# Patient Record
Sex: FEMALE | Race: BLACK OR AFRICAN AMERICAN | Employment: FULL TIME | URBAN - METROPOLITAN AREA
[De-identification: names, ages, dates, MRNs, and addresses within clinical notes are randomized per-mention and may not be internally consistent; named-entity substitution may affect disease eponyms.]

---

## 2021-06-16 ENCOUNTER — APPOINTMENT (OUTPATIENT)
Dept: PHYSICAL THERAPY | Age: 41
End: 2021-06-16

## 2021-06-29 ENCOUNTER — HOSPITAL ENCOUNTER (OUTPATIENT)
Dept: PHYSICAL THERAPY | Age: 41
Discharge: HOME | End: 2021-06-29
Payer: COMMERCIAL

## 2021-06-29 PROCEDURE — 97110 THERAPEUTIC EXERCISES: CPT | Performed by: PHYSICAL THERAPIST

## 2021-06-29 PROCEDURE — 97161 PT EVAL LOW COMPLEX 20 MIN: CPT | Performed by: PHYSICAL THERAPIST

## 2021-06-29 NOTE — PROGRESS NOTES
PT INITIAL EVALUATION NOTE - [facility] 2-15    Patient Name: Aimee Echevarria  Date:2021  : 1980  [x]  Patient  Verified  Payor: /    In time:750 AM  Out time:825 AM  Total Treatment Time (min): 35  Total Timed Codes (min): 15  1:1 Treatment Time ( [address] only): 15   Visit #: 1     Treatment Area: Right knee pain [M25.561]    SUBJECTIVE  Pain Level (0-10 scale): current 0 worst 4  Any medication changes, allergies to medications, adverse drug reactions, diagnosis change, or new procedure performed?: [] No    [x] Yes (see summary sheet for update)  Subjective:    Patient referred to PT with a chief complaint of R knee pain. She started a walking challenge in April and walked every day in May. At the end of May her R knee started popping and felt weak. She took a few days to elevate her knee which made it feel stronger. She saw Dr. Renate Tracy the beginning of  and her knee stopped popping. She had x-rays which were normal.   Pain Location: anterior R knee   Pain Description: discomfort   Aggravating Factors: walking   Relieving factors: walking with limp, elevation, rest   Current Functional Limitations: discomfort with prolonged walking, currently limiting walks to 3 days per week 2-3 miles    PLOF: Able to walk without popping and discomfort  Mechanism of Injury: none  Previous Treatment/Compliance: none  PMHx/Surgical Hx: HTN, Asthma   Work Hx: [employer]   Living Situation: lives with    Pt Goals: \"Strenghten my knee and also gain knowledge on what I can do at home. \"   Barriers: none   Motivation: good           OBJECTIVE/EXAMINATION  Observation: B knee hyperextension standing    Squat: B femoral adduction    Gait: Normal    Edema: none    ROM:     Hip PROM: B WFL    Knee ROM: WFL    Strength:     R Hip flexion 5/5  R Hip abduction 4/5  R Hip extension 4/5  R Knee extension 5/5  R Knee flexion 5/5    L Hip flexion 5/5  L Hip abduction 4/5  L Hip extension 4/5  L Knee extension 5/5  L Knee flexion 5/5    Palpation: non tender to palpation R fat pad     Joint mobility: hypermobile patellar glides B    Special Tests: 90 90 -, Michael's + B       15 min Therapeutic Exercise:  [x] See flow sheet : SLR flexion, bridge with squeeze, side lying hip abduction, SLS   Rationale: increase ROM, increase strength and improve balance to improve the patients ability to walk for exercise              With   [x] TE   [] TA   [] neuro   [] other: Patient Education: [x] Review HEP    [] Progressed/Changed HEP based on:   [] positioning   [] body mechanics   [] transfers   [] heat/ice application    [x] other: can increase walking to 4 days per week over the next 2 weeks, role of PT, expected course of PT, avoid deep knee flexion under a load       Other Objective/Functional Measures:NT    Pain Level (0-10 scale) post treatment: 0     ASSESSMENT/Changes in Function:     [x]  See Plan of [address], PT 6/29/2021  7:50 AM

## 2021-06-29 NOTE — PROGRESS NOTES
[facility] Physical Therapy  [address]  [place], [address]  Phone: [phone]  Fax: [phone]    Plan of Care/Statement of Necessity for Physical Therapy Services  2-15    Patient name: Adriane Ortiz  : 1980  Provider#: [id number]  Referral source: Yari Cardenas MD      Medical/Treatment Diagnosis: Right knee pain [M25.561]     Prior Hospitalization: see medical history     Comorbidities: HTN, Asthma  Prior Level of Function: Able to walk without popping and discomfort  Medications: Verified on Patient Summary List    Start of Care: 21      Onset Date: May 2021       The Plan of Care and following information is based on the information from the initial evaluation. Assessment/ key information: Patient presents with decreased R LE strength contributing to  R knee pain limiting ability to perform functional activities such as walking. She would benefit from skilled PT to increase R LE strength and decrease pain so she can return to prior level of function.      Evaluation Complexity History MEDIUM  Complexity : 1-2 comorbidities / personal factors will impact the outcome/ POC ; Examination LOW Complexity : 1-2 Standardized tests and measures addressing body structure, function, activity limitation and / or participation in recreation  ;Presentation LOW Complexity : Stable, uncomplicated  ;Clinical Decision Making LOW Complexity : FOTO score of   Overall Complexity Rating: LOW     Problem List: pain affecting function, decrease ROM, decrease strength, impaired gait/ balance, decrease ADL/ functional abilitiies and decrease activity tolerance   Treatment Plan may include any combination of the following: Therapeutic exercise, Therapeutic activities, Neuromuscular re-education, Physical agent/modality, Manual therapy and Patient education  Patient / Family readiness to learn indicated by: asking questions, trying to perform skills and interest  Persons(s) to be included in education: patient (P)  Barriers to Learning/Limitations: None  Patient Goal (s): Strenghten my knee and also gain knowledge on what I can do at home  Patient Self Reported Health Status: good  Rehabilitation Potential: good    Short Term Goals: To be accomplished in 2 weeks:    1. Patient will be I in HEP to promote self management of symptoms. 2. Patient will report pain level at worst as less than or equal to 2/10 so they can perform ADLs without pain. Long Term Goals: To be accomplished in 4-6 weeks:    1. Patient will report pain level at worst as less than or equal to 1/10 so they can perform ADLs without pain. 2. Patient will be able to ambulate 3 miles 5 days per week without R knee discomfort. Frequency / Duration: Patient to be seen 2 times per week for 4 weeks. Patient/ Caregiver education and instruction: exercises    [x]  Plan of care has been reviewed with VELIA Jones, PT 6/29/2021 8:30 AM    ________________________________________________________________________    I certify that the above Therapy Services are being furnished while the patient is under my care. I agree with the treatment plan and certify that this therapy is necessary.     [phone] Signature:____________________  Date:____________Time: _________

## 2021-07-01 ENCOUNTER — HOSPITAL ENCOUNTER (OUTPATIENT)
Dept: PHYSICAL THERAPY | Age: 41
Discharge: HOME | End: 2021-07-01
Payer: COMMERCIAL

## 2021-07-01 PROCEDURE — 97110 THERAPEUTIC EXERCISES: CPT | Performed by: PHYSICAL THERAPIST

## 2021-07-01 PROCEDURE — 97112 NEUROMUSCULAR REEDUCATION: CPT | Performed by: PHYSICAL THERAPIST

## 2021-07-01 NOTE — PROGRESS NOTES
PT DAILY TREATMENT NOTE - [facility] -15    Patient Name: Vincent Avalos  Date:2021  : 1980  [x]  Patient  Verified  Payor: Yamila Dinorah Ortize S / Plan: [facility] MEDICAL [place] [address] / Product Type: Commerical /    In time:1015 AM  Out time:1100 AM  Total Treatment Time (min): 45  Total Timed Codes (min): 45  Visit #:  2    Treatment Area: Right knee pain [M25.561]    SUBJECTIVE  Pain Level (0-10 scale): 0  Any medication changes, allergies to medications, adverse drug reactions, diagnosis change, or new procedure performed?: [x] No    [] Yes (see summary sheet for update)  Subjective functional status/changes:   [] No changes reported  Patient reports that she has had a funny feeling in her posterior R knee. She did her exercises yesterday. OBJECTIVE      35 min Therapeutic Exercise:  [x] See flow sheet : progressed per flow sheet    Rationale: increase ROM and increase strength to improve the patients ability to walk for exercise        10 min Neuromuscular Re-education:  [x]  See flow sheet : balance and proprioception exercises per flow sheet   Rationale: increase proprioception  to improve the patients ability to walk for exercise            With   [x] TE   [] TA   [] neuro   [] other: Patient Education: [x] Review HEP    [] Progressed/Changed HEP based on:   [] positioning   [] body mechanics   [] transfers   [] heat/ice application    [] other:      Other Objective/Functional Measures: NT     Pain Level (0-10 scale) post treatment: 0    ASSESSMENT/Changes in Function:   Patient tolerated exercise progression well today. Patient will continue to benefit from skilled PT services to modify and progress therapeutic interventions, address functional mobility deficits, address ROM deficits, address strength deficits, analyze and address soft tissue restrictions, analyze and cue movement patterns and analyze and modify body mechanics/ergonomics to attain remaining goals. Progress towards goals / Updated goals:  Short Term Goals: To be accomplished in 2 weeks:               1. Patient will be I in HEP to promote self management of symptoms. PROGRESSING              2. Patient will report pain level at worst as less than or equal to 2/10 so they can perform ADLs without pain. PROGRESSING  Long Term Goals: To be accomplished in 4-6 weeks:               1.  Patient will report pain level at worst as less than or equal to 1/10 so they can perform ADLs without pain. 2. Patient will be able to ambulate 3 miles 5 days per week without R knee discomfort.      PLAN  [x]  Upgrade activities as tolerated     [x]  Continue plan of care  []  Update interventions per flow sheet       []  Discharge due to:_  []  Other:_      Pranav Fabian, PT 7/1/2021

## 2021-07-07 ENCOUNTER — HOSPITAL ENCOUNTER (OUTPATIENT)
Dept: PHYSICAL THERAPY | Age: 41
Discharge: HOME | End: 2021-07-07
Payer: COMMERCIAL

## 2021-07-07 PROCEDURE — 97110 THERAPEUTIC EXERCISES: CPT | Performed by: PHYSICAL THERAPIST

## 2021-07-07 PROCEDURE — 97112 NEUROMUSCULAR REEDUCATION: CPT | Performed by: PHYSICAL THERAPIST

## 2021-07-07 NOTE — PROGRESS NOTES
PT DAILY TREATMENT NOTE - [facility] 2-15    Patient Name: Adriane Ortiz  Date:2021  : 1980  [x]  Patient  Verified  Payor: MEDICAL Manajrrez Chilo / Plan: [facility] MEDICAL [employer] [address] / Product Type: Commerical /    In time:1100 AM  Out time:1200 PM  Total Treatment Time (min): 60  Total Timed Codes (min): 60  Visit #:  3    Treatment Area: Right knee pain [M25.561]    SUBJECTIVE  Pain Level (0-10 scale): 0  Any medication changes, allergies to medications, adverse drug reactions, diagnosis change, or new procedure performed?: [x] No    [] Yes (see summary sheet for update)  Subjective functional status/changes:   [] No changes reported  Patient reports that she feels like she is getting stronger. OBJECTIVE      35 min Therapeutic Exercise:  [x] See flow sheet : progressed per flow sheet    Rationale: increase ROM and increase strength to improve the patients ability to walk for exercise        25 min Neuromuscular Re-education:  [x]  See flow sheet : balance and proprioception exercises per flow sheet   Rationale: increase proprioception  to improve the patients ability to walk for exercise            With   [x] TE   [] TA   [] neuro   [] other: Patient Education: [x] Review HEP    [] Progressed/Changed HEP based on:   [] positioning   [] body mechanics   [] transfers   [] heat/ice application    [] other:      Other Objective/Functional Measures: NT     Pain Level (0-10 scale) post treatment: 0    ASSESSMENT/Changes in Function:   Progressing well with exercise program.   Patient will continue to benefit from skilled PT services to modify and progress therapeutic interventions, address functional mobility deficits, address ROM deficits, address strength deficits, analyze and address soft tissue restrictions, analyze and cue movement patterns and analyze and modify body mechanics/ergonomics to attain remaining goals. Progress towards goals / Updated goals:  Short Term Goals:  To be accomplished in 2 weeks:               1. Patient will be I in HEP to promote self management of symptoms. PROGRESSING              2. Patient will report pain level at worst as less than or equal to 2/10 so they can perform ADLs without pain. PROGRESSING  Long Term Goals: To be accomplished in 4-6 weeks:               1.  Patient will report pain level at worst as less than or equal to 1/10 so they can perform ADLs without pain. 2. Patient will be able to ambulate 3 miles 5 days per week without R knee discomfort.      PLAN  [x]  Upgrade activities as tolerated     [x]  Continue plan of care  []  Update interventions per flow sheet       []  Discharge due to:_  []  Other:_      Cholo Bobby, PT 7/7/2021

## 2021-07-20 ENCOUNTER — HOSPITAL ENCOUNTER (OUTPATIENT)
Dept: PHYSICAL THERAPY | Age: 41
Discharge: HOME | End: 2021-07-20
Payer: COMMERCIAL

## 2021-07-20 PROCEDURE — 97112 NEUROMUSCULAR REEDUCATION: CPT | Performed by: PHYSICAL THERAPIST

## 2021-07-20 PROCEDURE — 97110 THERAPEUTIC EXERCISES: CPT | Performed by: PHYSICAL THERAPIST

## 2021-07-20 NOTE — PROGRESS NOTES
PT DAILY TREATMENT NOTE - [facility] 2-15    Patient Name: Abdiel Cloud  Date:2021  : 1980  [x]  Patient  Verified  Payor: MEDICAL Manjarrez Chilo / Plan: [facility]ICAL [employer] [address] / Product Type: Commerical /    In time: 125 PM  Out time: 220 PM  Total Treatment Time (min): 55  Total Timed Codes (min): 55  Visit #:  4    Treatment Area: Right knee pain [M25.561]    SUBJECTIVE  Pain Level (0-10 scale): 0  Any medication changes, allergies to medications, adverse drug reactions, diagnosis change, or new procedure performed?: [x] No    [] Yes (see summary sheet for update)  Subjective functional status/changes:   [] No changes reported  Patient reports that her R knee feels good. OBJECTIVE      35 min Therapeutic Exercise:  [x] See flow sheet : progressed per flow sheet    Rationale: increase ROM and increase strength to improve the patients ability to walk for exercise        20 min Neuromuscular Re-education:  [x]  See flow sheet : balance and proprioception exercises per flow sheet   Rationale: increase proprioception  to improve the patients ability to walk for exercise            With   [x] TE   [] TA   [] neuro   [] other: Patient Education: [x] Review HEP    [] Progressed/Changed HEP based on:   [] positioning   [] body mechanics   [] transfers   [] heat/ice application    [] other:      Other Objective/Functional Measures: NT     Pain Level (0-10 scale) post treatment: 0    ASSESSMENT/Changes in Function:   Progressing well with exercise program. Progressing with strength and decreased pain. Patient will continue to benefit from skilled PT services to modify and progress therapeutic interventions, address functional mobility deficits, address ROM deficits, address strength deficits, analyze and address soft tissue restrictions, analyze and cue movement patterns and analyze and modify body mechanics/ergonomics to attain remaining goals.            Progress towards goals / Updated goals:  Short Term Goals: To be accomplished in 2 weeks:               1. Patient will be I in HEP to promote self management of symptoms. PROGRESSING              2. Patient will report pain level at worst as less than or equal to 2/10 so they can perform ADLs without pain. PROGRESSING  Long Term Goals: To be accomplished in 4-6 weeks:               1.  Patient will report pain level at worst as less than or equal to 1/10 so they can perform ADLs without pain. 2. Patient will be able to ambulate 3 miles 5 days per week without R knee discomfort.      PLAN  [x]  Upgrade activities as tolerated     [x]  Continue plan of care  []  Update interventions per flow sheet       []  Discharge due to:_  []  Other:_      Rony Jimenez, PT 7/20/2021

## 2021-07-23 ENCOUNTER — HOSPITAL ENCOUNTER (OUTPATIENT)
Dept: PHYSICAL THERAPY | Age: 41
Discharge: HOME | End: 2021-07-23
Payer: COMMERCIAL

## 2021-07-23 PROCEDURE — 97112 NEUROMUSCULAR REEDUCATION: CPT | Performed by: PHYSICAL THERAPIST

## 2021-07-23 PROCEDURE — 97110 THERAPEUTIC EXERCISES: CPT | Performed by: PHYSICAL THERAPIST

## 2021-07-23 NOTE — PROGRESS NOTES
PT DAILY TREATMENT NOTE - [facility] 2-15    Patient Name: Vincent Avalos  Date:2021  : 1980  [x]  Patient  Verified  Payor: MEDICAL Manjarrez Chilo / Plan: [facility] MEDICAL [employer] [address] / Product Type: Commerical /    In time: 8546 AM  Out time: 1115 AM  Total Treatment Time (min): 60  Total Timed Codes (min): 60  Visit #:  5    Treatment Area: Right knee pain [M25.561]    SUBJECTIVE  Pain Level (0-10 scale): 0  Any medication changes, allergies to medications, adverse drug reactions, diagnosis change, or new procedure performed?: [x] No    [] Yes (see summary sheet for update)  Subjective functional status/changes:   [] No changes reported  Patient reports that she has not had any problems with her R knee. OBJECTIVE      35 min Therapeutic Exercise:  [x] See flow sheet : progressed per flow sheet    Rationale: increase ROM and increase strength to improve the patients ability to walk for exercise        25 min Neuromuscular Re-education:  [x]  See flow sheet : balance and proprioception exercises per flow sheet   Rationale: increase proprioception  to improve the patients ability to walk for exercise            With   [x] TE   [] TA   [] neuro   [] other: Patient Education: [x] Review HEP    [] Progressed/Changed HEP based on:   [] positioning   [] body mechanics   [] transfers   [] heat/ice application    [] other:      Other Objective/Functional Measures: NT     Pain Level (0-10 scale) post treatment: 0    ASSESSMENT/Changes in Function:   Patient with medial R knee discomfort when she had R leg crossed over L on TG. She tolerated all other exercises well.    Patient will continue to benefit from skilled PT services to modify and progress therapeutic interventions, address functional mobility deficits, address ROM deficits, address strength deficits, analyze and address soft tissue restrictions, analyze and cue movement patterns and analyze and modify body mechanics/ergonomics to attain remaining goals. Progress towards goals / Updated goals:  Short Term Goals: To be accomplished in 2 weeks:               1. Patient will be I in HEP to promote self management of symptoms. PROGRESSING              2. Patient will report pain level at worst as less than or equal to 2/10 so they can perform ADLs without pain. PROGRESSING  Long Term Goals: To be accomplished in 4-6 weeks:               1.  Patient will report pain level at worst as less than or equal to 1/10 so they can perform ADLs without pain. PROGRESSING              2. Patient will be able to ambulate 3 miles 5 days per week without R knee discomfort.  PROGRESSING    PLAN  [x]  Upgrade activities as tolerated     [x]  Continue plan of care  []  Update interventions per flow sheet       []  Discharge due to:_  []  Other:_      Alvaro White, PT 7/23/2021

## 2021-07-27 ENCOUNTER — HOSPITAL ENCOUNTER (OUTPATIENT)
Dept: PHYSICAL THERAPY | Age: 41
Discharge: HOME | End: 2021-07-27
Payer: COMMERCIAL

## 2021-07-27 PROCEDURE — 97110 THERAPEUTIC EXERCISES: CPT | Performed by: PHYSICAL THERAPIST

## 2021-07-27 PROCEDURE — 97112 NEUROMUSCULAR REEDUCATION: CPT | Performed by: PHYSICAL THERAPIST

## 2021-07-27 NOTE — PROGRESS NOTES
PT DAILY TREATMENT NOTE - [facility] 2-15    Patient Name: Shruthi Navas  Date:2021  : 1980  [x]  Patient  Verified  Payor: MEDICAL Manjarrez Chilo / Plan: [facility] MEDICAL [employer] [address] [facility] / Product Type: Commerical /    In time: 845 AM  Out time: 945 AM  Total Treatment Time (min): 60  Total Timed Codes (min): 60  Visit #:  6    Treatment Area: Right knee pain [M25.561]    SUBJECTIVE  Pain Level (0-10 scale): 0  Any medication changes, allergies to medications, adverse drug reactions, diagnosis change, or new procedure performed?: [x] No    [] Yes (see summary sheet for update)  Subjective functional status/changes:   [] No changes reported  Patient reports that her R knee continues to feel good. OBJECTIVE      35 min Therapeutic Exercise:  [x] See flow sheet : progressed per flow sheet    Rationale: increase ROM and increase strength to improve the patients ability to walk for exercise        25 min Neuromuscular Re-education:  [x]  See flow sheet : balance and proprioception exercises per flow sheet   Rationale: increase proprioception  to improve the patients ability to walk for exercise            With   [x] TE   [] TA   [] neuro   [] other: Patient Education: [x] Review HEP    [] Progressed/Changed HEP based on:   [] positioning   [] body mechanics   [] transfers   [] heat/ice application    [] other:      Other Objective/Functional Measures: NT     Pain Level (0-10 scale) post treatment: 0    ASSESSMENT/Changes in Function:   Patient tolerated exercise progression well today. Patient will continue to benefit from skilled PT services to modify and progress therapeutic interventions, address functional mobility deficits, address ROM deficits, address strength deficits, analyze and address soft tissue restrictions, analyze and cue movement patterns and analyze and modify body mechanics/ergonomics to attain remaining goals.            Progress towards goals / Updated goals:  Short Term Goals: To be accomplished in 2 weeks:               1. Patient will be I in HEP to promote self management of symptoms. PROGRESSING              2. Patient will report pain level at worst as less than or equal to 2/10 so they can perform ADLs without pain. PROGRESSING  Long Term Goals: To be accomplished in 4-6 weeks:               1.  Patient will report pain level at worst as less than or equal to 1/10 so they can perform ADLs without pain. PROGRESSING              2. Patient will be able to ambulate 3 miles 5 days per week without R knee discomfort.  PROGRESSING    PLAN  [x]  Upgrade activities as tolerated     [x]  Continue plan of care  []  Update interventions per flow sheet       []  Discharge due to:_  []  Other:_      Lucie Guerrero, PT 7/27/2021

## 2021-07-29 ENCOUNTER — HOSPITAL ENCOUNTER (OUTPATIENT)
Dept: PHYSICAL THERAPY | Age: 41
Discharge: HOME | End: 2021-07-29
Payer: COMMERCIAL

## 2021-07-29 PROCEDURE — 97112 NEUROMUSCULAR REEDUCATION: CPT | Performed by: PHYSICAL THERAPIST

## 2021-07-29 PROCEDURE — 97110 THERAPEUTIC EXERCISES: CPT | Performed by: PHYSICAL THERAPIST

## 2021-07-29 NOTE — ANCILLARY DISCHARGE INSTRUCTIONS
Physical Therapy at [facility],   a part of [facility]  [address]  [place], [address]  Phone: [phone]  Fax: [phone]    Discharge Summary  2-15    Patient name: Sreekanth Garsia  : 1980  Provider#:[id number]  Referral source: Kirt Patel MD      Medical/Treatment Diagnosis: Right knee pain [M25.561]     Prior Hospitalization: see medical history     Comorbidities: HTN, Asthma  Prior Level of Function:Able to walk without popping and discomfort  Medications: Verified on Patient Summary List    Start of Care: 21      Onset Date:May 2021   Visits from Start of Care: 7     Missed Visits: 0  Reporting Period : 21 to 21    Short Term Goals: To be accomplished in 2 weeks:               1. Patient will be I in HEP to promote self management of symptoms. MET              2. Patient will report pain level at worst as less than or equal to 2/10 so they can perform ADLs without pain. MET  Long Term Goals: To be accomplished in 4-6 weeks:               8.  Patient will report pain level at worst as less than or equal to 1/10 so they can perform ADLs without pain. PROGRESSING              2. Patient will be able to ambulate 3 miles 5 days per week without R knee discomfort. PROGRESSING (able to walk 1 mile)       ASSESSMENT/SUMMARY OF CARE: Patient has been seen x 7 visits. She has progressed with decreased knee pain and increased strength. She is I with HEP.      RECOMMENDATIONS:  [x]Discontinue therapy: [x]Patient has reached or is progressing toward set goals      []Patient is non-compliant or has abdicated      []Due to lack of appreciable progress towards set goals    Pranav Fabian, PT 2021

## 2021-07-29 NOTE — PROGRESS NOTES
PT DAILY TREATMENT NOTE/PROGRESS NOTE - [facility] 15    Patient Name: Marisela Duffy  Date:2021  : 1980  [x]  Patient  Verified  Payor: MEDICAL [employer]ilo / Plan: [facility]ICAL [place] [facility] / Product Type: Commerical /    In time: 745 AM  Out time: 840 AM  Total Treatment Time (min): 55  Total Timed Codes (min): 55  Visit #:  7    Treatment Area: Right knee pain [M25.561]    SUBJECTIVE  Pain Level (0-10 scale): 0 current 1-2 worst   Any medication changes, allergies to medications, adverse drug reactions, diagnosis change, or new procedure performed?: [x] No    [] Yes (see summary sheet for update)  Subjective functional status/changes:   [] No changes reported  Patient reports that her knee feels stronger. She hasn't been hearing or feeling the clicking. Reports 99% improvement in symptoms. She has been able to walk faster.       OBJECTIVE  Observation:               Squat: normal      Gait: Normal     Edema: none     ROM:      Hip PROM: B WFL     Knee ROM: WFL     Strength:     R Hip flexion 5/5  R Hip abduction 4+/5  R Hip extension 4+/5  R Knee extension 5/5  R Knee flexion 5/5     L Hip flexion 5/5  L Hip abduction 4+/5  L Hip extension 4+/5  L Knee extension 5/5  L Knee flexion 5/5     Special Tests: 90 90 -, Michael's + B          30 min Therapeutic Exercise:  [x] See flow sheet : progressed per flow sheet    Rationale: increase ROM and increase strength to improve the patients ability to walk for exercise        25 min Neuromuscular Re-education:  [x]  See flow sheet : balance and proprioception exercises per flow sheet   Rationale: increase proprioception  to improve the patients ability to walk for exercise            With   [x] TE   [] TA   [] neuro   [] other: Patient Education: [x] Review HEP    [] Progressed/Changed HEP based on:   [] positioning   [] body mechanics   [] transfers   [] heat/ice application    [x] other: updated HEP provided     Other Objective/Functional Measures: NT     Pain Level (0-10 scale) post treatment: 0    ASSESSMENT/Changes in Function:   Patient has been seen x 7 visits. She has progressed with decreased knee pain and increased strength. She is I with HEP. Progress towards goals / Updated goals:  Short Term Goals: To be accomplished in 2 weeks:               1. Patient will be I in HEP to promote self management of symptoms. MET              2. Patient will report pain level at worst as less than or equal to 2/10 so they can perform ADLs without pain. MET  Long Term Goals: To be accomplished in 4-6 weeks:               1.  Patient will report pain level at worst as less than or equal to 1/10 so they can perform ADLs without pain. PROGRESSING              2. Patient will be able to ambulate 3 miles 5 days per week without R knee discomfort.  PROGRESSING (able to walk 1 mile)      PLAN  []  Upgrade activities as tolerated     []  Continue plan of care  []  Update interventions per flow sheet       [x]  Discharge due to:_Progress toward goals   []  Other:_      Sophie Villagran, PT 7/29/2021

## 2021-08-02 ENCOUNTER — APPOINTMENT (OUTPATIENT)
Dept: PHYSICAL THERAPY | Age: 41
End: 2021-08-02

## 2023-05-30 ENCOUNTER — HOSPITAL ENCOUNTER (EMERGENCY)
Facility: HOSPITAL | Age: 43
Discharge: HOME | End: 2023-05-30
Attending: EMERGENCY MEDICINE
Payer: COMMERCIAL

## 2023-05-30 VITALS
BODY MASS INDEX: 28.72 KG/M2 | HEART RATE: 92 BPM | HEIGHT: 65 IN | OXYGEN SATURATION: 97 % | RESPIRATION RATE: 16 BRPM | WEIGHT: 172.4 LBS | DIASTOLIC BLOOD PRESSURE: 104 MMHG | SYSTOLIC BLOOD PRESSURE: 127 MMHG | TEMPERATURE: 98.5 F

## 2023-05-30 DIAGNOSIS — M43.6 TORTICOLLIS: Primary | ICD-10-CM

## 2023-05-30 PROCEDURE — 6370000000 HC RX 637 (ALT 250 FOR IP): Performed by: EMERGENCY MEDICINE

## 2023-05-30 PROCEDURE — 99283 EMERGENCY DEPT VISIT LOW MDM: CPT

## 2023-05-30 RX ORDER — LIDOCAINE 4 G/G
1 PATCH TOPICAL DAILY
Qty: 30 PATCH | Refills: 0 | Status: SHIPPED | OUTPATIENT
Start: 2023-05-30 | End: 2023-06-29

## 2023-05-30 RX ORDER — LIDOCAINE 4 G/G
1 PATCH TOPICAL DAILY
Status: DISCONTINUED | OUTPATIENT
Start: 2023-05-30 | End: 2023-05-30

## 2023-05-30 RX ORDER — LIDOCAINE 4 G/G
1 PATCH TOPICAL
Status: DISCONTINUED | OUTPATIENT
Start: 2023-05-30 | End: 2023-05-30 | Stop reason: HOSPADM

## 2023-05-30 RX ORDER — METHOCARBAMOL 500 MG/1
500 TABLET, FILM COATED ORAL 4 TIMES DAILY
Qty: 40 TABLET | Refills: 0 | Status: SHIPPED | OUTPATIENT
Start: 2023-05-30 | End: 2023-06-09

## 2023-05-30 ASSESSMENT — ENCOUNTER SYMPTOMS
COUGH: 0
SORE THROAT: 0
VOMITING: 0

## 2023-05-30 ASSESSMENT — PAIN SCALES - GENERAL: PAINLEVEL_OUTOF10: 7

## 2023-05-30 ASSESSMENT — PAIN DESCRIPTION - ORIENTATION: ORIENTATION: LEFT

## 2023-05-30 ASSESSMENT — PAIN DESCRIPTION - LOCATION: LOCATION: NECK

## 2023-05-30 NOTE — ED PROVIDER NOTES
[facility] EM[facility]GENCY [facility]  EMERGENCY DEPARTMENT ENCOUNTER      Pt Name: Gary Siddiqui  MRN: [id number]  Jjgfnancy 1980  Date of evaluation: 5/30/2023  Provider: Agapito Villatoro MD    CHIEF COMPLAINT       Chief Complaint   Patient presents with    Neck Injury         HISTORY OF PRESENT ILLNESS   (Location/Symptom, Timing/Onset, Context/Setting, Quality, Duration, Modifying Factors, Severity)  Note limiting factors. 58-year-old female presents from home via private vehicle with complaint of left-sided neck pain. States she woke up with the symptoms 4 days ago. She slept wrong and woke up with a tightness in her neck. Pain worsened with movement of her neck. No numbness or tingling in her arms. Denies any falls or injuries. Has been taking over-the-counter pain medication but with little relief of the symptoms. Review of External Medical Records:     Nursing Notes were reviewed. REVIEW OF SYSTEMS    (2-9 systems for level 4, 10 or more for level 5)     Review of Systems   Constitutional:  Negative for fatigue. HENT:  Negative for sore throat. Eyes:  Negative for visual disturbance. Respiratory:  Negative for cough. Cardiovascular:  Negative for palpitations. Gastrointestinal:  Negative for vomiting. Genitourinary:  Negative for difficulty urinating. Musculoskeletal:  Negative for myalgias. Skin:  Negative for rash. Neurological:  Negative for weakness. Except as noted above the remainder of the review of systems was reviewed and negative. PAST MEDICAL HISTORY   History reviewed. No pertinent past medical history. SURGICAL HISTORY     History reviewed. No pertinent surgical history. CURRENT MEDICATIONS       Previous Medications    No medications on file       ALLERGIES     Amoxicillin and Erythromycin    FAMILY HISTORY     No family history on file.        SOCIAL HISTORY       Social History     Socioeconomic History    Marital status:      Spouse name:

## 2023-05-30 NOTE — ED NOTES
Patient is discharged home. Alert, oriented, and ambulatory. Patient is in no distress. Education given regarding follow up and medication. Patient verbalizes understanding.        Yahaira Lozano RN  05/30/23 [id number]

## 2023-05-31 ENCOUNTER — CARE COORDINATION (OUTPATIENT)
Dept: OTHER | Facility: CLINIC | Age: 43
End: 2023-05-31

## 2023-05-31 NOTE — CARE COORDINATION
Ambulatory Care Coordination Note    ACM attempted to reach patient for introduction to [facility] related to [facility] ER 5/30/23. Diagnosis: left neck pain. HIPAA compliant message left requesting a return phone call at patient convenience. Plan for follow-up call in 1-2 days      No future appointments.

## 2023-06-01 ENCOUNTER — CARE COORDINATION (OUTPATIENT)
Dept: OTHER | Facility: CLINIC | Age: 43
End: 2023-06-01

## 2023-06-01 NOTE — CARE COORDINATION
Ambulatory Care Coordination Note    ACM attempted 2nd outreach to patient for introduction to [facility] related to [facility] ER 5/30/23. Diagnosis: left neck pain. HIPAA compliant message left requesting a return phone call at patient convenience. Unable to Reach Letter sent to patient via [employer]. Will continue to outreach. No future appointments.